# Patient Record
Sex: MALE | NOT HISPANIC OR LATINO
[De-identification: names, ages, dates, MRNs, and addresses within clinical notes are randomized per-mention and may not be internally consistent; named-entity substitution may affect disease eponyms.]

---

## 2023-05-09 PROBLEM — Z00.00 ENCOUNTER FOR PREVENTIVE HEALTH EXAMINATION: Status: ACTIVE | Noted: 2023-05-09

## 2023-05-30 ENCOUNTER — NON-APPOINTMENT (OUTPATIENT)
Age: 28
End: 2023-05-30

## 2023-06-01 ENCOUNTER — APPOINTMENT (OUTPATIENT)
Dept: UROLOGY | Facility: CLINIC | Age: 28
End: 2023-06-01
Payer: MEDICAID

## 2023-06-01 ENCOUNTER — TRANSCRIPTION ENCOUNTER (OUTPATIENT)
Age: 28
End: 2023-06-01

## 2023-06-01 ENCOUNTER — NON-APPOINTMENT (OUTPATIENT)
Age: 28
End: 2023-06-01

## 2023-06-01 VITALS — SYSTOLIC BLOOD PRESSURE: 118 MMHG | TEMPERATURE: 97.8 F | DIASTOLIC BLOOD PRESSURE: 76 MMHG | HEART RATE: 61 BPM

## 2023-06-01 PROCEDURE — 93975 VASCULAR STUDY: CPT

## 2023-06-01 PROCEDURE — 99204 OFFICE O/P NEW MOD 45 MIN: CPT | Mod: 25

## 2023-06-01 NOTE — HISTORY OF PRESENT ILLNESS
[FreeTextEntry1] : ALYCIA SESAY is a 27 year M with PMHx of OCD presenting for hypogonadism on 06/01/2023\par \par \par Current meds: Risperidone, prozac\par \par He reports 1 child 6 years old, trying since then. [facility]. Wife Kimberley Alvarez with CAH/?PCOS, 31. Patient had hormone labs performed in December. Patient underwent L varicocelectomy at 13 years old. \par \par Morning time labs demonstrate: , FSH 5.8, LH 3.4, Prl 15.3\par \par SA: \par 1 ml/3 mil/ml/67% motility/7% morphology\par \par Social history; Denies\par \par Family history: Father testicular cancer - diagnosed @ 45, brain cancer\par \par Allergies: Peanuts, fish

## 2023-06-01 NOTE — PHYSICAL EXAM
[Normal Appearance] : normal appearance [General Appearance - In No Acute Distress] : no acute distress [] : no respiratory distress [Respiration, Rhythm And Depth] : normal respiratory rhythm and effort [Exaggerated Use Of Accessory Muscles For Inspiration] : no accessory muscle use [Abdomen Soft] : soft [Urethral Meatus] : meatus normal [Penis Abnormality] : normal circumcised penis [Urinary Bladder Findings] : the bladder was normal on palpation [Scrotum] : the scrotum was normal [Testes Tenderness] : no tenderness of the testes [Testes Mass (___cm)] : there were no testicular masses [Normal Station and Gait] : the gait and station were normal for the patient's age [Affect] : the affect was normal [Oriented To Time, Place, And Person] : oriented to person, place, and time [FreeTextEntry1] : no testicular masses noted, L testicle 8 cc, R testicle 16 cc. No varicocele

## 2023-06-01 NOTE — ASSESSMENT
[FreeTextEntry1] : 27 M here for hypogonadism\par oligospermia\par recurrent left varicocele\par +size discrepancy\par proven sonographically today\par reports previous groin incision\par consider varicoceleecotmy vs embolization\par - Trial clomid 50 qod\par labs 4 weeks\par f/u 6 weeks

## 2023-06-01 NOTE — LETTER BODY
[Dear  ___] : Dear  [unfilled], [FreeTextEntry2] : Jose F Hill MD\par [facility]\par [address]\[place] [address] [FreeTextEntry1] : I had the pleasure of seeing ALYCIA SESAY, a 27 year old man,  to your patient Kimberley Alvarez, in the office in consultation today. Please see the attached note for full details.\par \par Thank you very much for allowing me to participate in the care of this patient. If you have any questions please feel free to call me at any time. \par \par \par Sincerely yours,\par \par \par \par Jasiel Coleman MD, AMOS\par Director, Male Fertility and Microsurgery\par  of Urology\par [facility]\par

## 2023-06-22 ENCOUNTER — APPOINTMENT (OUTPATIENT)
Dept: UROLOGY | Facility: CLINIC | Age: 28
End: 2023-06-22

## 2023-07-31 ENCOUNTER — APPOINTMENT (OUTPATIENT)
Dept: UROLOGY | Facility: CLINIC | Age: 28
End: 2023-07-31

## 2023-08-03 NOTE — HISTORY OF PRESENT ILLNESS
[FreeTextEntry1] : Dear TORI Delaney (Urologist)\par \par Thank you so much for the referral to help care for your patient.\par \par Chief Complaint: Varicocele\par Date of first visit: 07/31/2023\par \par ALYCIA SESAY, is a 27 year old ~race man ~ who presents for Varicocele\par \par Ultrasound Hx:\par 06/01/2023 in office with TORI Delaney \par 1. Small volume left testis\par 2. Left varicocele\par 3. Simple left epididymal cyst\par 4. Bilateral trace hydrocele\par  \par 07/31/2023\par IPSS    QOL \par JULIETH \par EPIC 26  \par \par The patient denies fevers, chills, nausea and or vomiting and no unexplained weight loss.\par \par All pertinent laboratory, films and physician notes were reviewed.  Questionnaire results were discussed with patient.

## 2023-08-25 ENCOUNTER — NON-APPOINTMENT (OUTPATIENT)
Age: 28
End: 2023-08-25

## 2023-08-30 ENCOUNTER — APPOINTMENT (OUTPATIENT)
Dept: UROLOGY | Facility: CLINIC | Age: 28
End: 2023-08-30
Payer: MEDICAID

## 2023-08-30 VITALS
HEART RATE: 83 BPM | OXYGEN SATURATION: 97 % | DIASTOLIC BLOOD PRESSURE: 64 MMHG | TEMPERATURE: 98.4 F | SYSTOLIC BLOOD PRESSURE: 96 MMHG

## 2023-08-30 VITALS — BODY MASS INDEX: 17.63 KG/M2 | HEIGHT: 73 IN | WEIGHT: 133 LBS

## 2023-08-30 DIAGNOSIS — E29.1 TESTICULAR HYPOFUNCTION: ICD-10-CM

## 2023-08-30 DIAGNOSIS — Z86.59 PERSONAL HISTORY OF OTHER MENTAL AND BEHAVIORAL DISORDERS: ICD-10-CM

## 2023-08-30 DIAGNOSIS — Z80.8 FAMILY HISTORY OF MALIGNANT NEOPLASM OF OTHER ORGANS OR SYSTEMS: ICD-10-CM

## 2023-08-30 PROCEDURE — 99214 OFFICE O/P EST MOD 30 MIN: CPT

## 2023-08-30 RX ORDER — LAMOTRIGINE 25 MG/1
TABLET ORAL
Refills: 0 | Status: ACTIVE | COMMUNITY

## 2023-08-30 NOTE — PHYSICAL EXAM
[General Appearance - Well Developed] : well developed [] : no respiratory distress [Abdomen Soft] : soft [Urethral Meatus] : meatus normal [Penis Abnormality] : normal circumcised penis [FreeTextEntry1] : palpable left side varicocele

## 2023-08-30 NOTE — ASSESSMENT
[FreeTextEntry1] : 27 year old gentleman with infertility and a grade II (palpable) left varicocele with a small volume testis.    Malampatti IV - needs anesthesia support  We discussed disease process and treatment options for symptomatic varicoceles and infertility. The patient understands the scrotal pain may resolve the heaviness that he feels in his scrotum however, improving his fertility is not 100% guaranteed. There are studies that show there is improvement in semen parameters after treatment of the varicocele.  We discussed the risks and benefits of alternatives associated with gonadal embolization regards to nontarget embolization complications of access and migration of coils. We also discussed the success rates of procedures in comparison to surgery.  1. Motrin 800 mg po BID start of the procedure 2. Labs C,7 3. Follow up 1 month after procedure 4. 3 months US and Semen analysis (69 days for new sperm to mature)  Thank you very much for allowing me to assist in the care of this patient. Should you have any additional questions or concerns please do not hesitate to contact me.   Sincerely,   Kirk Crowley D.O. Professor of Urology and Radiology  of Urology at [facility] Director for Prostate Cancer [address] [place] [address] Phone: [phone]

## 2023-08-30 NOTE — HISTORY OF PRESENT ILLNESS
[FreeTextEntry1] : Dear TORI Delaney (Urologist)   Thank you so much for the referral to help care for your patient.   Chief Complaint: Varicocele 2nd Opinion Date of first visit: 08/30/2023   ALYCIA SESAY, is a 27 year old  gentleman who presents for Varicocele 2nd Opinion.  He reports 1 child 6 years old, trying since then. [facility] fertility specialist. Wife Kimberley Alvarez with CAH/?PCOS, 31. Patient had hormone labs performed in December. Patient underwent L varicocelectomy at 13 years old.  Morning time labs demonstrate: , FSH 5.8, LH 3.4, Prl 15.3  SA: 1 ml/3 mil/ml/67% motility/7% morphology   06/01/2023 in Office Ultrasound. 1. Small volume left testis 2. Left varicocele 3.5mm 3. Simple left epididymal cyst 4. Bilateral trace hydrocele     The patient denies fevers, chills, nausea and or vomiting and no unexplained weight loss.   All pertinent laboratory, films and physician notes were reviewed.  Questionnaire results were discussed with patient.

## 2023-08-30 NOTE — ADDENDUM
[FreeTextEntry1] : I, Dr. Crowley, personally performed the evaluation and management (E/M) services for this established patient who presents today with (a) new problem(s)/exacerbation of (an) existing condition(s).  That E/M includes conducting the examination, assessing all new/exacerbated conditions, and establishing a new plan of care.  Today, my ACP, Kena Dunbar, was here to observe my evaluation and management services for this new problem/exacerbated condition to be followed going forward.

## 2023-08-31 LAB
ANION GAP SERPL CALC-SCNC: 9 MMOL/L
BUN SERPL-MCNC: 13 MG/DL
CALCIUM SERPL-MCNC: 9.4 MG/DL
CHLORIDE SERPL-SCNC: 104 MMOL/L
CO2 SERPL-SCNC: 28 MMOL/L
CREAT SERPL-MCNC: 1.09 MG/DL
EGFR: 95 ML/MIN/1.73M2
GLUCOSE SERPL-MCNC: 88 MG/DL
HCT VFR BLD CALC: 48.1 %
HGB BLD-MCNC: 16.3 G/DL
MCHC RBC-ENTMCNC: 32 PG
MCHC RBC-ENTMCNC: 33.9 GM/DL
MCV RBC AUTO: 94.5 FL
PLATELET # BLD AUTO: 188 K/UL
POTASSIUM SERPL-SCNC: 4.4 MMOL/L
RBC # BLD: 5.09 M/UL
RBC # FLD: 12.8 %
SODIUM SERPL-SCNC: 142 MMOL/L
WBC # FLD AUTO: 5.91 K/UL

## 2023-10-03 ENCOUNTER — NON-APPOINTMENT (OUTPATIENT)
Age: 28
End: 2023-10-03

## 2023-10-31 ENCOUNTER — NON-APPOINTMENT (OUTPATIENT)
Age: 28
End: 2023-10-31

## 2023-10-31 ENCOUNTER — OUTPATIENT (OUTPATIENT)
Dept: OUTPATIENT SERVICES | Facility: HOSPITAL | Age: 28
LOS: 1 days | End: 2023-10-31
Payer: MEDICAID

## 2023-10-31 ENCOUNTER — APPOINTMENT (OUTPATIENT)
Dept: INTERVENTIONAL RADIOLOGY/VASCULAR | Facility: HOSPITAL | Age: 28
End: 2023-10-31

## 2023-10-31 ENCOUNTER — APPOINTMENT (OUTPATIENT)
Dept: UROLOGY | Facility: HOSPITAL | Age: 28
End: 2023-10-31

## 2023-10-31 ENCOUNTER — RESULT REVIEW (OUTPATIENT)
Age: 28
End: 2023-10-31

## 2023-10-31 VITALS
DIASTOLIC BLOOD PRESSURE: 104 MMHG | HEIGHT: 72 IN | HEART RATE: 67 BPM | OXYGEN SATURATION: 100 % | RESPIRATION RATE: 18 BRPM | TEMPERATURE: 98 F

## 2023-10-31 DIAGNOSIS — I86.1 SCROTAL VARICES: ICD-10-CM

## 2023-10-31 PROCEDURE — 75831 VEIN X-RAY KIDNEY: CPT

## 2023-10-31 PROCEDURE — 36012 PLACE CATHETER IN VEIN: CPT

## 2023-10-31 PROCEDURE — C1769: CPT

## 2023-10-31 PROCEDURE — C1894: CPT

## 2023-10-31 PROCEDURE — C1887: CPT

## 2023-10-31 PROCEDURE — 75831 VEIN X-RAY KIDNEY: CPT | Mod: 26,LT

## 2023-10-31 NOTE — PROCEDURE NOTE - PROCEDURE FINDINGS AND DETAILS
Left renal-gonadal venogram, full report to follow.   Unable to visualize distal connection to left varicocele

## 2023-10-31 NOTE — PRE PROCEDURE NOTE - PRE PROCEDURE EVALUATION
Patient Age: 28    Patient Gender: Male    Procedure (including site / side if known):  LEFT VARICOCELE EMBOLIZATION    Diagnosis / Indication:  Left Varicocele, Infertility  28M with previous Left varicocelectomy (13 years old) who presents with abnormal SA, infertility and left varicocele.     Interventional Radiology Attending Physician:   Dr. Crowley    Pertinent Medical History:    PAST MEDICAL & SURGICAL HISTORY:  LEFT VARICOCELECTOMY (15 YEARS AGO)        Pertinent Labs:                      Patient and Family aware:   [X]Y   [  ]N    We discussed disease process and treatment options for symptomatic varicoceles and infertility. The patient understands the scrotal pain may resolve the heaviness that he feels in his scrotum however improving his fertility is not 100% guaranteed. There are studies that show there is improvement in semen parameters after treatment of the varicocele.    We discussed the risks and benefits alternatives associated with gonadal embolization regards to nontarget embolization complications of access and migration of coils. We also discussed the success rates of procedures in comparison to surgery.    We had an in-depth conversation regarding the benefit of varicocelectomy today. He understands the literature which overwhelming reports a benefit in improvement in semen parameters. He also understands that there is limited data in terms of spontaneous pregnancy and take-home baby rates. There is good data suggesting improved semen parameters which can possibly obviate the need for future IVF and has been shown to improve IVF outcomes in selected patients. We discussed the recent results of a [facility] review which reported that one natural pregnancy is achieved for every 3-5 varicocele repairs over an unknown time frame. We also spoke about the fact that there is a 3-to-6-month delay before improvement is seen in semen parameters. The role of advancing maternal age was discussed in depth.    Surgical risks, including a risk of infection, injury to the testicular artery (extremely rare), injury to the vas deferens and hydrocele were discussed, as was post operative pain and pain control. Post operative skin numbness in the anterior thigh/lateral scrotum were also discussed.     Written consent in chart.  Mallampati IV - needs anesthesia assistance

## 2023-11-01 ENCOUNTER — TRANSCRIPTION ENCOUNTER (OUTPATIENT)
Age: 28
End: 2023-11-01

## 2023-11-01 ENCOUNTER — NON-APPOINTMENT (OUTPATIENT)
Age: 28
End: 2023-11-01

## 2023-11-28 ENCOUNTER — APPOINTMENT (OUTPATIENT)
Dept: UROLOGY | Facility: CLINIC | Age: 28
End: 2023-11-28
Payer: MEDICAID

## 2023-11-28 VITALS
BODY MASS INDEX: 17.49 KG/M2 | HEIGHT: 73 IN | OXYGEN SATURATION: 98 % | TEMPERATURE: 98.2 F | WEIGHT: 132 LBS | DIASTOLIC BLOOD PRESSURE: 77 MMHG | HEART RATE: 83 BPM | SYSTOLIC BLOOD PRESSURE: 121 MMHG

## 2023-11-28 DIAGNOSIS — I86.1 SCROTAL VARICES: ICD-10-CM

## 2023-11-28 DIAGNOSIS — N46.9 MALE INFERTILITY, UNSPECIFIED: ICD-10-CM

## 2023-11-28 PROCEDURE — 99214 OFFICE O/P EST MOD 30 MIN: CPT

## 2023-12-04 ENCOUNTER — APPOINTMENT (OUTPATIENT)
Dept: UROLOGY | Facility: CLINIC | Age: 28
End: 2023-12-04

## 2024-06-10 RX ORDER — CLOMIPHENE CITRATE 50 MG/1
50 TABLET ORAL
Qty: 30 | Refills: 3 | Status: ACTIVE | COMMUNITY
Start: 2023-06-01 | End: 1900-01-01